# Patient Record
Sex: MALE | Race: WHITE | NOT HISPANIC OR LATINO | Employment: PART TIME
[De-identification: names, ages, dates, MRNs, and addresses within clinical notes are randomized per-mention and may not be internally consistent; named-entity substitution may affect disease eponyms.]

---

## 2023-12-31 ENCOUNTER — HEALTH MAINTENANCE LETTER (OUTPATIENT)
Age: 28
End: 2023-12-31

## 2024-02-01 ENCOUNTER — OFFICE VISIT (OUTPATIENT)
Dept: INTERNAL MEDICINE | Facility: CLINIC | Age: 29
End: 2024-02-01
Payer: COMMERCIAL

## 2024-02-01 ENCOUNTER — LAB (OUTPATIENT)
Dept: LAB | Facility: CLINIC | Age: 29
End: 2024-02-01
Payer: COMMERCIAL

## 2024-02-01 VITALS
DIASTOLIC BLOOD PRESSURE: 73 MMHG | WEIGHT: 215.8 LBS | OXYGEN SATURATION: 97 % | BODY MASS INDEX: 27.7 KG/M2 | HEIGHT: 74 IN | HEART RATE: 64 BPM | SYSTOLIC BLOOD PRESSURE: 135 MMHG

## 2024-02-01 DIAGNOSIS — Z11.59 NEED FOR HEPATITIS C SCREENING TEST: ICD-10-CM

## 2024-02-01 DIAGNOSIS — Z11.4 SCREENING FOR HIV (HUMAN IMMUNODEFICIENCY VIRUS): ICD-10-CM

## 2024-02-01 DIAGNOSIS — F90.2 ATTENTION DEFICIT HYPERACTIVITY DISORDER (ADHD), COMBINED TYPE: Primary | ICD-10-CM

## 2024-02-01 DIAGNOSIS — F32.9 MAJOR DEPRESSIVE DISORDER, REMISSION STATUS UNSPECIFIED, UNSPECIFIED WHETHER RECURRENT: ICD-10-CM

## 2024-02-01 LAB
HCV AB SERPL QL IA: NONREACTIVE
HIV 1+2 AB+HIV1 P24 AG SERPL QL IA: NONREACTIVE

## 2024-02-01 PROCEDURE — 99000 SPECIMEN HANDLING OFFICE-LAB: CPT | Performed by: PATHOLOGY

## 2024-02-01 PROCEDURE — 87389 HIV-1 AG W/HIV-1&-2 AB AG IA: CPT | Performed by: PEDIATRICS

## 2024-02-01 PROCEDURE — 36415 COLL VENOUS BLD VENIPUNCTURE: CPT | Performed by: PATHOLOGY

## 2024-02-01 PROCEDURE — 86803 HEPATITIS C AB TEST: CPT | Performed by: PEDIATRICS

## 2024-02-01 PROCEDURE — 90471 IMMUNIZATION ADMIN: CPT

## 2024-02-01 PROCEDURE — 90686 IIV4 VACC NO PRSV 0.5 ML IM: CPT

## 2024-02-01 PROCEDURE — 99204 OFFICE O/P NEW MOD 45 MIN: CPT | Mod: 25

## 2024-02-01 RX ORDER — BUPROPION HYDROCHLORIDE 150 MG/1
150 TABLET ORAL EVERY MORNING
Qty: 30 TABLET | Refills: 0 | Status: SHIPPED | OUTPATIENT
Start: 2024-02-01 | End: 2024-05-26

## 2024-02-01 RX ORDER — TRIAMCINOLONE ACETONIDE 0.25 MG/G
OINTMENT TOPICAL PRN
COMMUNITY
Start: 2022-01-03 | End: 2024-02-02

## 2024-02-01 RX ORDER — METHYLPHENIDATE HYDROCHLORIDE 27 MG/1
27 TABLET ORAL EVERY MORNING
Qty: 30 TABLET | Refills: 0 | Status: SHIPPED | OUTPATIENT
Start: 2024-02-01 | End: 2024-02-22 | Stop reason: ALTCHOICE

## 2024-02-01 RX ORDER — DEXTROAMPHETAMINE SACCHARATE, AMPHETAMINE ASPARTATE MONOHYDRATE, DEXTROAMPHETAMINE SULFATE AND AMPHETAMINE SULFATE 5; 5; 5; 5 MG/1; MG/1; MG/1; MG/1
20 CAPSULE, EXTENDED RELEASE ORAL DAILY
COMMUNITY
Start: 2019-01-07 | End: 2024-02-01 | Stop reason: ALTCHOICE

## 2024-02-01 ASSESSMENT — ANXIETY QUESTIONNAIRES
7. FEELING AFRAID AS IF SOMETHING AWFUL MIGHT HAPPEN: NOT AT ALL
1. FEELING NERVOUS, ANXIOUS, OR ON EDGE: SEVERAL DAYS
3. WORRYING TOO MUCH ABOUT DIFFERENT THINGS: SEVERAL DAYS
IF YOU CHECKED OFF ANY PROBLEMS ON THIS QUESTIONNAIRE, HOW DIFFICULT HAVE THESE PROBLEMS MADE IT FOR YOU TO DO YOUR WORK, TAKE CARE OF THINGS AT HOME, OR GET ALONG WITH OTHER PEOPLE: SOMEWHAT DIFFICULT
6. BECOMING EASILY ANNOYED OR IRRITABLE: NOT AT ALL
GAD7 TOTAL SCORE: 4
5. BEING SO RESTLESS THAT IT IS HARD TO SIT STILL: NOT AT ALL
GAD7 TOTAL SCORE: 4
2. NOT BEING ABLE TO STOP OR CONTROL WORRYING: SEVERAL DAYS

## 2024-02-01 ASSESSMENT — PATIENT HEALTH QUESTIONNAIRE - PHQ9
5. POOR APPETITE OR OVEREATING: SEVERAL DAYS
SUM OF ALL RESPONSES TO PHQ QUESTIONS 1-9: 12

## 2024-02-01 NOTE — PATIENT INSTRUCTIONS
Summary of today's plan:  - HIV & Hepatitis C screen ordered  - Flu vaccine today  - Methylphenidate (Concerta) ordered at 27 mg daily (previous dose was 36 mg daily) for 1 month supply  - Bupropion HBr ordered at 174 mg daily (previous dose was 348 mg daily) for 1 month supply  - Please upload immunization records to [facility] when convenient  - Reach out via E-Visit on [facility] in 3 weeks

## 2024-02-01 NOTE — PROGRESS NOTES
"I, Nima Walter MD saw the patient with the resident, and agree with the resident's findings and plan of care as documented in the resident's note.  /73 (BP Location: Right arm, Patient Position: Sitting, Cuff Size: Adult Large)   Pulse 64   Ht 1.88 m (6' 2\")   Wt 97.9 kg (215 lb 12.8 oz)   SpO2 97%   BMI 27.71 kg/m    I personally reviewed vital signs and past record.  Key findings: ADD, has been off meds because previous provider retired. Was switched off of Adderall for preference of local clinic. He is fine with continuing methylphenidate. Best to restart at regular dose before returning to his medium dose that he has been off for a while.  "

## 2024-02-01 NOTE — PROGRESS NOTES
Manohar is a 28 year old that presents in clinic today for the following:     Chief Complaint   Patient presents with    Establish Care           2/1/2024     8:51 AM   Additional Questions   Roomed by MR     Screenings as of today     PHQ-2 Total Score (Adult) - Positive if 3 or more points; Administer   PHQ-9 if positive 4        Vanessa Cordova EMT at 8:57 AM on 2/1/2024

## 2024-02-01 NOTE — PROGRESS NOTES
"Subjective     Manohar SURENDRA Vera is a 28 year old man, with concerns including:  Chief Complaint   Patient presents with    Establish Care     PCP: No Ref-Primary, Physician   Visit type: problem-oriented    PMH of MDD and ADHD.     Feels overall well with no acute health concerns. Has had ADHD since he was a kid but did not get started on medication until he was ~23 years old. Has been a very noticeable improvement. Off medication since about November 2023 due to changing providers and insurance.     Was on Concerta for 7923-3775 (about 10-12 months). Prior to that was on Adderall XR 20 mg daily. Also takes bupropion for MDD.     Moved to [facility] from [facility] about 6 months ago. Plays soccer 2-3 times a week. At the gym 6 days a week. Eats a varied diet with lots of protein and veggies.     Denies any current fevers/chills, SOB, chest pain, or abd pain.       Review of Systems  As noted in HPI.     As part of this encounter, I reviewed (and updated as appropriate) the past medical, family, and social history.    Past Medical History:   Diagnosis Date    Depressive disorder 1/1/23     No past surgical history on file.      Objective     /73 (BP Location: Right arm, Patient Position: Sitting, Cuff Size: Adult Large)   Pulse 64   Ht 1.88 m (6' 2\")   Wt 97.9 kg (215 lb 12.8 oz)   SpO2 97%   BMI 27.71 kg/m      Physical Exam   Gen: No acute distress. Appears comfortable.   HEENT: Normocephalic, atraumatic. PERRLA, EOMI. No cervical adenopathy.   CV: Regular rate and rhythm without murmur.   Resp: CTAB, non-labored breathing.   Abd: Soft, non-tender to palpation.   Extr: No pitting edema in bilateral lower extremities. Warm and well-perfused.   Neuro: Alert and oriented x3. Cranial nerves II-XII intact. Strength is normal and symmetrical in upper and lower extremities. Gait is normal.   Psych: Appropriate affect.         Assessment & Plan     Manohar is a 28yoM with PMH of ADHD and MDD who presents to " establish care.     # Health maintenance  - Received TDAP within last 10 years  - Flu vaccine today, patient will complete COVID vaccine at a later time  - HIV & Hepatitis C screen ordered    # ADHD  Most recently on Concerta 5159-7199 but has been off all meds for the last 3 months due to transitioning healthcare. Was also previously on Adderall XR prior to that.   - Will resume on methylphenidate at partial dose (previously on 36 mg daily) of 27 mg daily, 1 months supply ordered  - Can likely increase methylphenidate to 36 mg daily after 1 month  - E-visit in 3 weeks to review med resumption    # MDD  Previously on bupropion HBr 348 mg daily but has been off this for last 3 months due to transitioning healthcare.   - Will start buproprion (24 hr) at 150 mg daily (previously HBr 348 mg daily), 1 month supply ordered  - Can likely increase bupropion to 300 mg daily after 1 month  - E-visit in 3 weeks to review med resumption    RTC: E-visit in 3 weeks        Patient seen and discussed with Dr. Walter.     Fredo De Paz MD  PGY-2  Internal Medicine

## 2024-02-02 ENCOUNTER — MYC REFILL (OUTPATIENT)
Dept: INTERNAL MEDICINE | Facility: CLINIC | Age: 29
End: 2024-02-02
Payer: COMMERCIAL

## 2024-02-02 DIAGNOSIS — K62.89 ANAL IRRITATION: Primary | ICD-10-CM

## 2024-02-02 NOTE — TELEPHONE ENCOUNTER
triamcinolone (KENALOG) 0.025 % external ointment      Last Written Prescription Date:  1/3/22  Last Fill Quantity: na,   # refills: nq  Last Office Visit : 2/1/24  Future Office visit:  2/22/24    Routing refill request to provider for review/approval because:  Medication is reported/historical

## 2024-02-07 PROBLEM — K62.89 ANAL IRRITATION: Status: ACTIVE | Noted: 2024-02-07

## 2024-02-07 RX ORDER — TRIAMCINOLONE ACETONIDE 0.25 MG/G
OINTMENT TOPICAL PRN
Qty: 15 G | Refills: 3 | Status: SHIPPED | OUTPATIENT
Start: 2024-02-07

## 2024-02-08 NOTE — ASSESSMENT & PLAN NOTE
"[employer] message:  The Triamcinolone was prescribed to me by Dr. Morris at [facility] while I was in [place]. (Would it be helpful if I called them and asked that they transfer any pertinent information?)     He gave me this cream to care for a skin irritation, rather unfortunately, on the anus. I will occasionally experience redness, chaffing, and eventually bleeding, leading to an inability to walk or function in a normal work environment.      I have tried to determine the cause, thinking it may be linked to something in my diet causing unhealthy bowel movements, but I have yet to find anything that seems to be a specific cause.      For now I have been managing the occasional discomfort (Usually once per week) by applying the Triamcinolone. It has relieved the issue each time.      My reply  Thanks, that's helpful. I refilled the cream.  Also, I was able to get your records from [facility] by the computer system. Sorry - the rooming staff should have done that at your visit.         One caution: if you use the cream daily for a long time, then there could be further weakening of the skin, or (less likely) of the sphincter muscle itself. I recommend:  (a) mostly the irritation should be treated with a cream (example = vanicream) or petroleum jelly (ideally from a tube rather than a dish, so the jelly container stays clean).  (b) if you find blood in your stool, we should see you in person.  (c) If the problem gets worse or needs triamcinolone for more than a week at a time, we should have it examined by someone - either a dermatologist or a primary care provider who is experienced (i.e., also known as \"old,\" like me). If the problem becomes serious, we can always have you seen by a colorectal provider.    If you have any questions or concerns, please call my office (at [phone]) and ask to speak with my nurse.   "

## 2024-02-22 ENCOUNTER — VIRTUAL VISIT (OUTPATIENT)
Dept: INTERNAL MEDICINE | Facility: CLINIC | Age: 29
End: 2024-02-22
Payer: COMMERCIAL

## 2024-02-22 DIAGNOSIS — F90.2 ATTENTION DEFICIT HYPERACTIVITY DISORDER (ADHD), COMBINED TYPE: Primary | ICD-10-CM

## 2024-02-22 DIAGNOSIS — F32.9 MAJOR DEPRESSIVE DISORDER, REMISSION STATUS UNSPECIFIED, UNSPECIFIED WHETHER RECURRENT: ICD-10-CM

## 2024-02-22 PROCEDURE — 99442 PR PHYSICIAN TELEPHONE EVALUATION 11-20 MIN: CPT | Mod: GC

## 2024-02-22 RX ORDER — BUPROPION HYDROCHLORIDE 150 MG/1
150 TABLET ORAL EVERY MORNING
Qty: 30 TABLET | Refills: 0 | Status: SHIPPED | OUTPATIENT
Start: 2024-02-29 | End: 2024-04-02

## 2024-02-22 RX ORDER — METHYLPHENIDATE HYDROCHLORIDE 36 MG/1
36 TABLET ORAL EVERY MORNING
Qty: 90 TABLET | Refills: 0 | Status: SHIPPED | OUTPATIENT
Start: 2024-02-22 | End: 2024-05-22

## 2024-02-22 NOTE — PROGRESS NOTES
"Manohar is a 28 year old who is being evaluated via a billable telephone visit.      What phone number would you like to be contacted at? [phone]   How would you like to obtain your AVS? [facility]    Distant Location (provider location):  On-site    Assessment & Plan   28 y.o male with PMH of Attention deficit hyperactivity disorder and major depressive disorder  presents over telephone visit for titration of medications for same.     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   Feels improvement in ADHD symptoms though not at his baseline where he used to be on the 20 mg of Amphetamine (Adderall); Started last 3 weeks by Dr. De Paz on Methylphenydate (Concerta) at 27 mg (equivalent to 15 mg of Amphetamine; patient previously on 20 mg); So will uptitrate to 36 today of Methylphenydate, and patient will give interval feedback to Dr. Orr and follow up in 90 days for refills.   - methylphenidate HCl ER, OSM, (CONCERTA) 36 MG CR tablet; Take 1 tablet (36 mg) by mouth every morning for 90 days    Major depressive disorder, remission status unspecified, unspecified whether recurrent    Subjective improvement of mood in the interval. Adequate to complete a full fair trial of 6 weeks before considering uptitrating. Will extend dose of 150 mg for total of 6 weeks (will actually give 2 weeks more than this) Patient to [employer] message Dr. Orr around 3/15/2024 for decision regarding increasing or not based on symptoms. He endorsed understnaing.   - buPROPion (WELLBUTRIN XL) 150 MG 24 hr tablet; Take 1 tablet (150 mg) by mouth every morning for 30 days            BMI  Estimated body mass index is 27.71 kg/m  as calculated from the following:    Height as of 2/1/24: 1.88 m (6' 2\").    Weight as of 2/1/24: 97.9 kg (215 lb 12.8 oz). "         FUTURE APPOINTMENTS:       - Follow-up visit in 3 months; with PCP for ongoing refills.     No follow-ups on file.    Chery Villela is a 28 year old, presenting for the following health issues:  RECHECK and Recheck Medication        2/1/2024     8:51 AM   Additional Questions   Roomed by MR     History of Present Illness       Reason for visit:  Check up for medications    He eats 0-1 servings of fruits and vegetables daily.He consumes 1 sweetened beverage(s) daily.He exercises with enough effort to increase his heart rate 60 or more minutes per day.  He exercises with enough effort to increase his heart rate 6 days per week.   He is taking medications regularly.        Busy overall. Requires focus.   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   Feels improvement in ADHD symptoms though not at his baseline where he used to be on the 20 mg of Amphetamine (Adderall); Started last 3 weeks by Dr. De Paz on Methylphenydate (Concerta) at 27 mg (equivalent to 15 mg of Amphetamine; patient previously on 20 mg); So will uptitrate to 36 today of Methylphenydate, and patient will give interval feedback to Dr. Orr and follow up in 90 days for refills.     MDD - also interval improvement on bupropion 150 every day, open to full adequate trial of 6 weeks and will msg Dr. Orr via my Chart about interval symptoms to see if need for titrating further.     ROS    Denies any headaches dizziness, vision changes, hasn't noticed appetite increase at this time. Endorses     Objective    Vitals - Patient Reported  Pain Score: No Pain (0)  No vitals as telephone visit.       Physical Exam   Neurologic: can hear and make sensible conversation over telephone.   Pulm: Speaking in full sentences, doesn't sound dyspneic over telephone.         Phone call duration: 15  minutes  Dr. Orr was present  the entirety of the phone visit.   Signed Electronically by: DEBBI WILL MD

## 2024-02-22 NOTE — PATIENT INSTRUCTIONS
Thank you for visiting the Primary Care Center today at the [facility]! The following is some information about our clinic:     Primary Care Center Frequently-Asked Questions    (1) How do I schedule appointments at the [facility]?     Primary Care--to schedule or make changes to an existing appointment, please call our primary care line at [phone].    Labs--to schedule a lab appointment at the [facility] you can use [employer] or call [phone]. If you have a [facility] location that is closer to home, you can reach out to that location for scheduling options.     Imaging--if you need to schedule a CT, X-ray, MRI, ultrasound, or other imaging study you can call [phone] to schedule at the [facility] or any other [facility] imaging location.     Referrals--if a referral to another specialty was ordered you can expect a phone call from their scheduling team. If you have not heard from them in a week, please call us or send us a [employer] message to check the status or get a scheduling number. Please note that this only applies to internal [facility] referrals. If the referral is external you would need to contact their office for scheduling.     (2) I have a question about my visit, who do I contact?     You can call us at the primary care line at [phone] to ask questions about your visit. You can also send a secure message through [employer], which is reviewed by clinic staff. Please note that [employer] messages have a twenty-four to forty-eight business hour turnaround time and should not be used for urgent concerns.    (3) How will I get the results of my tests?    If you are signed up for [employer] all tests will be released to you within twenty-four hours of resulting. Please allow three to five days for your doctor to review your results and place a note interpreting the results. If you do not have [employer] you will receive your  results through mail seven to ten business days following the return of the tests. Please note that if there should be any urgent or concerning results that your doctor or their registered nurse will reach out to you the same day as the tests come back. If you have follow up questions about your results or would like to discuss the results in detail please schedule a follow up with your provider either in person or virtually.     (4) How do I get refills of my prescriptions?     You should always first contact your pharmacy for refills of your medications. If submitting a refill request on [employer], please be sure to submit the request only once--repeat requests can cause delays in refill. If you are requesting a NEW medication or a medication related to new symptoms you will need to schedule an appointment with a provider prior to approval. Please note: Routine medication refills have up to one to three business day turnaround whereas controlled substances refills have up to five to seven business day turnaround.    (5) I have new symptoms, what do I do?     If you are having an immediate medical emergency, you should dial 911 for assistance.   For anything urgent that needs to be seen within a few hours to one day you should visit a local urgent care for assistance.  For non-urgent symptoms that need to be seen within a few days to a week you can schedule with an available provider in primary care by going to [employer] or calling [phone].   If you are not sure how serious your symptoms are or you would like to receive medical advice you can always call [phone] to speak with a triage nurse.

## 2024-02-22 NOTE — NURSING NOTE
Is the patient currently in the state of [place]? YES    Visit mode:TELEPHONE    If the visit is dropped, the patient can be reconnected by: TELEPHONE VISIT: Phone number: [phone]    Will anyone else be joining the visit? NO  (If patient encounters technical issues they should call [phone])    How would you like to obtain your AVS? MyChart    Are changes needed to the allergy or medication list? Pt stated no changes to allergies and Pt stated no med changes    Reason for visit: RECHECK and Recheck Medication    Riddhi RYANF

## 2024-04-02 ENCOUNTER — MYC REFILL (OUTPATIENT)
Dept: INTERNAL MEDICINE | Facility: CLINIC | Age: 29
End: 2024-04-02
Payer: COMMERCIAL

## 2024-04-02 DIAGNOSIS — F32.9 MAJOR DEPRESSIVE DISORDER, REMISSION STATUS UNSPECIFIED, UNSPECIFIED WHETHER RECURRENT: ICD-10-CM

## 2024-04-03 RX ORDER — BUPROPION HYDROCHLORIDE 150 MG/1
150 TABLET ORAL EVERY MORNING
Qty: 90 TABLET | Refills: 3 | Status: SHIPPED | OUTPATIENT
Start: 2024-04-03

## 2024-05-23 ENCOUNTER — OFFICE VISIT (OUTPATIENT)
Dept: INTERNAL MEDICINE | Facility: CLINIC | Age: 29
End: 2024-05-23
Payer: COMMERCIAL

## 2024-05-23 VITALS
HEIGHT: 74 IN | OXYGEN SATURATION: 98 % | SYSTOLIC BLOOD PRESSURE: 121 MMHG | DIASTOLIC BLOOD PRESSURE: 75 MMHG | BODY MASS INDEX: 25.26 KG/M2 | WEIGHT: 196.8 LBS | HEART RATE: 77 BPM

## 2024-05-23 DIAGNOSIS — F90.2 ATTENTION DEFICIT HYPERACTIVITY DISORDER (ADHD), COMBINED TYPE: ICD-10-CM

## 2024-05-23 PROCEDURE — 99213 OFFICE O/P EST LOW 20 MIN: CPT | Mod: GC

## 2024-05-23 RX ORDER — METHYLPHENIDATE HYDROCHLORIDE 36 MG/1
36 TABLET ORAL EVERY MORNING
Qty: 90 TABLET | Refills: 0 | Status: SHIPPED | OUTPATIENT
Start: 2024-05-23 | End: 2024-06-20

## 2024-05-23 RX ORDER — METHYLPHENIDATE HYDROCHLORIDE 36 MG/1
36 TABLET ORAL EVERY MORNING
Qty: 90 TABLET | Refills: 0 | Status: CANCELLED | OUTPATIENT
Start: 2024-05-23

## 2024-05-23 ASSESSMENT — PATIENT HEALTH QUESTIONNAIRE - PHQ9
10. IF YOU CHECKED OFF ANY PROBLEMS, HOW DIFFICULT HAVE THESE PROBLEMS MADE IT FOR YOU TO DO YOUR WORK, TAKE CARE OF THINGS AT HOME, OR GET ALONG WITH OTHER PEOPLE: SOMEWHAT DIFFICULT
SUM OF ALL RESPONSES TO PHQ QUESTIONS 1-9: 9
SUM OF ALL RESPONSES TO PHQ QUESTIONS 1-9: 9

## 2024-05-23 NOTE — PATIENT INSTRUCTIONS
Summary of today's plan:  - Continue bupropion (Wellbutrin) at current dose of 150 mg daily  - Continue to follow with your therapist  - Refilled Concerta 36 mg daily  - OK to adjust timing of Concerta on certain long days at work  - Follow up via Virtual Visit in ~3 months

## 2024-05-23 NOTE — PROGRESS NOTES
Assessment & Plan     Manohar is a 29yoM with PMH of MDD and ADHD who presents for follow-up.     # ADHD  Doing well on Concerta 36 mg daily. Feels that this is the right dose for him. Does struggle with some longer days at work.   - Continue Concerta 36 mg daily  - Discussed strategies to adjust timing of when to take Concerta to accommodate for extended days at work    # MDD  Currently feeling well with bupropion 150 mg daily. Has occasional thoughts of self harm, but he states he is never serious about it and is seeing a therapist currently which has been very helpful. No suicidal ideation. He feels that overall, his mental health is in a good place at this time. No change to medication regimen.   - Continue bupropion 150 mg daily  - Continue to follow with therapist      Depression Screening Follow Up  Addressed above        Follow Up Actions Taken  - Patient has upcoming appointment with his therapist  - Acute mental health concerns addressed at this visit as above    Discussed the following ways the patient can remain in a safe environment:    Patient currently feels safe at his home environment    RTC: 3 months via virtual visit    Patient seen and discussed with Dr. Orr.     Fredo De Paz MD  PGY2, Internal Medicine      Subjective   Manohar is a 29 year old, presenting for the following health issues:  Follow Up (Pt here for follow up; recheck medication)      5/23/2024     7:59 AM   Additional Questions   Roomed by Mary BOND     History of Present Illness       Reason for visit:  Checkup due to medication    He eats 2-3 servings of fruits and vegetables daily.He consumes 1 sweetened beverage(s) daily.He exercises with enough effort to increase his heart rate 60 or more minutes per day.  He exercises with enough effort to increase his heart rate 6 days per week.   He is taking medications regularly.    Has PMH of MDD and ADHD.     Here for follow-up. Was restarted on Concerta for ADHD about 3 months ago  "after a period off meds due to transition of care. Feeling well from ADHD standpoint and feels that his dose is good without significant side effects. He notes that he does have some longer days at work where he feels like the effect of the medication does not last him the whole day, in particular on days where he has 18 hours of work in a row (full day at [employer] followed by [employer] shift).     With regard to his mood, he does have occasional days where he has thoughts of self harm. He states that he never takes these thoughts serious and does not have suicidal ideation at this time. He just feels overall that life has been busy and stressful due to working 2 part-time jobs with tough schedules. He is currently seeing a therapist and has a follow-up scheduled for later today. He is happy with his current medication and therapist. Feels safe at home.     Denies any fevers/chills or other health concerns. 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 Exercises 5-6 times a week and participates in 3 different soccer leagues.      ROS as noted in HPI.         Objective    /75 (BP Location: Right arm, Patient Position: Sitting, Cuff Size: Adult Large)   Pulse 77   Ht 1.88 m (6' 2\")   Wt 89.3 kg (196 lb 12.8 oz)   SpO2 98%   BMI 25.27 kg/m    Body mass index is 25.27 kg/m .  Physical Exam   Gen: No acute distress, sitting up in chair.   HEENT: Normocephalic, atraumatic.   CV: Regular rate and rhythm.   Resp: CTAB, non-labored breathing on room air.   Abd: Soft, non-tender to palpation, +BS.   Neuro: Alert and conversant.   Psych: Appropriate affect without suicidal ideation. Mood is \"ok.\"            Signed Electronically by: Fredo De Paz MD  "

## 2024-06-20 ENCOUNTER — MYC REFILL (OUTPATIENT)
Dept: INTERNAL MEDICINE | Facility: CLINIC | Age: 29
End: 2024-06-20
Payer: COMMERCIAL

## 2024-06-20 DIAGNOSIS — F90.2 ATTENTION DEFICIT HYPERACTIVITY DISORDER (ADHD), COMBINED TYPE: ICD-10-CM

## 2024-06-20 RX ORDER — METHYLPHENIDATE HYDROCHLORIDE 36 MG/1
36 TABLET ORAL EVERY MORNING
Qty: 90 TABLET | Refills: 0 | Status: SHIPPED | OUTPATIENT
Start: 2024-06-20 | End: 2024-10-04

## 2024-07-10 ENCOUNTER — MYC REFILL (OUTPATIENT)
Dept: INTERNAL MEDICINE | Facility: CLINIC | Age: 29
End: 2024-07-10
Payer: COMMERCIAL

## 2024-07-10 DIAGNOSIS — F32.9 MAJOR DEPRESSIVE DISORDER, REMISSION STATUS UNSPECIFIED, UNSPECIFIED WHETHER RECURRENT: ICD-10-CM

## 2024-07-10 RX ORDER — BUPROPION HYDROCHLORIDE 150 MG/1
150 TABLET ORAL EVERY MORNING
Qty: 90 TABLET | Refills: 3 | OUTPATIENT
Start: 2024-07-10

## 2024-07-10 NOTE — TELEPHONE ENCOUNTER
Requesting refill too early. Years worth of refills were sent to pharmacy in April 2024.  Tami MERRILL LPN  [facility]

## 2024-08-08 ENCOUNTER — OFFICE VISIT (OUTPATIENT)
Dept: INTERNAL MEDICINE | Facility: CLINIC | Age: 29
End: 2024-08-08
Payer: COMMERCIAL

## 2024-08-08 ENCOUNTER — LAB (OUTPATIENT)
Dept: LAB | Facility: CLINIC | Age: 29
End: 2024-08-08
Payer: COMMERCIAL

## 2024-08-08 VITALS
DIASTOLIC BLOOD PRESSURE: 73 MMHG | HEART RATE: 72 BPM | BODY MASS INDEX: 25.1 KG/M2 | HEIGHT: 74 IN | WEIGHT: 195.6 LBS | OXYGEN SATURATION: 98 % | SYSTOLIC BLOOD PRESSURE: 111 MMHG

## 2024-08-08 DIAGNOSIS — L63.9 ALOPECIA AREATA: Primary | ICD-10-CM

## 2024-08-08 DIAGNOSIS — L63.9 ALOPECIA AREATA: ICD-10-CM

## 2024-08-08 LAB — TSH SERPL DL<=0.005 MIU/L-ACNC: 2.02 UIU/ML (ref 0.3–4.2)

## 2024-08-08 PROCEDURE — 99000 SPECIMEN HANDLING OFFICE-LAB: CPT | Performed by: PATHOLOGY

## 2024-08-08 PROCEDURE — 86376 MICROSOMAL ANTIBODY EACH: CPT | Performed by: INTERNAL MEDICINE

## 2024-08-08 PROCEDURE — 36415 COLL VENOUS BLD VENIPUNCTURE: CPT | Performed by: PATHOLOGY

## 2024-08-08 PROCEDURE — 99213 OFFICE O/P EST LOW 20 MIN: CPT | Mod: GE

## 2024-08-08 PROCEDURE — 84443 ASSAY THYROID STIM HORMONE: CPT | Performed by: PATHOLOGY

## 2024-08-08 RX ORDER — TRAZODONE HYDROCHLORIDE 50 MG/1
TABLET, FILM COATED ORAL
COMMUNITY
Start: 2023-01-07

## 2024-08-08 ASSESSMENT — PATIENT HEALTH QUESTIONNAIRE - PHQ9
SUM OF ALL RESPONSES TO PHQ QUESTIONS 1-9: 6
SUM OF ALL RESPONSES TO PHQ QUESTIONS 1-9: 6
10. IF YOU CHECKED OFF ANY PROBLEMS, HOW DIFFICULT HAVE THESE PROBLEMS MADE IT FOR YOU TO DO YOUR WORK, TAKE CARE OF THINGS AT HOME, OR GET ALONG WITH OTHER PEOPLE: SOMEWHAT DIFFICULT

## 2024-08-08 NOTE — PATIENT INSTRUCTIONS
Summary of today's plan:  - Screening for thyroid disorders  - Dermatology referral for intralesional corticosteroid injection

## 2024-08-08 NOTE — PROGRESS NOTES
"  Assessment & Plan     Manohar is a 29yoM with PMH of ADHD who presents for evaluation of hair loss.     # Alopecia areata  Focal area of complete hair loss with acute onset is concerning for alopecia areata. - Referral to dermatology for consideration of intralesional corticosteroid injection per discussion with patient  - Screening for thyroid disease with TSH and TPO antibodies    BMI  Estimated body mass index is 25.11 kg/m  as calculated from the following:    Height as of this encounter: 1.88 m (6' 2\").    Weight as of this encounter: 88.7 kg (195 lb 9.6 oz).   Not addressed at this visit    RTC: as needed    Discussed with Dr. Laura.     Fredo De Paz MD  PGY3, Internal Medicine      Subjective   Manohar is a 29 year old, presenting for the following health issues:  Consult (Hair loss/ bald spot, happened day or 2 after haircut, been 2 months hasn't grown back )      8/8/2024     2:21 PM   Additional Questions   Roomed by [facility] EMT     History of Present Illness       Reason for visit:  Worrying bald patch  Symptom onset:  More than a month  Symptoms include:  Bald spot on left sideburn.  Symptom intensity:  Moderate  Symptom progression:  Staying the same  Had these symptoms before:  Radha consumes 2 sweetened beverage(s) daily.He exercises with enough effort to increase his heart rate 60 or more minutes per day.  He exercises with enough effort to increase his heart rate 6 days per week. He is missing 1 dose(s) of medications per week.  He is not taking prescribed medications regularly due to remembering to take.    Here to discuss area of hair loss. First noticed after a haircut 2 months. Did not see at the time of haircut but came to his attention about 2 days later. At first thought it was just a bad haircut but then the area never grew back any hair. No frequent headwear or tight headwear. No pulling at hair. No fevers/chills, skin injuries, or rash.     ROS as noted in HPI.             Objective    /73 " "(BP Location: Right arm, Patient Position: Sitting, Cuff Size: Adult Regular)   Pulse 72   Ht 1.88 m (6' 2\")   Wt 88.7 kg (195 lb 9.6 oz)   SpO2 98%   BMI 25.11 kg/m    Body mass index is 25.11 kg/m .  Physical Exam   Gen: No acute distress  HEENT: Circular ~5 cm area of complete hair loss at left upper-lateral temple area  CV: Regular rate  Pulm: Non-labored breathing  Neuro: Alert and conversant            Signed Electronically by: Fredo De Paz MD    "

## 2024-08-09 LAB — THYROPEROXIDASE AB SERPL-ACNC: 27 IU/ML

## 2024-10-04 ENCOUNTER — MYC REFILL (OUTPATIENT)
Dept: INTERNAL MEDICINE | Facility: CLINIC | Age: 29
End: 2024-10-04
Payer: COMMERCIAL

## 2024-10-04 DIAGNOSIS — F90.2 ATTENTION DEFICIT HYPERACTIVITY DISORDER (ADHD), COMBINED TYPE: ICD-10-CM

## 2024-10-06 RX ORDER — METHYLPHENIDATE HYDROCHLORIDE 36 MG/1
36 TABLET ORAL EVERY MORNING
Qty: 90 TABLET | Refills: 0 | Status: SHIPPED | OUTPATIENT
Start: 2024-10-06

## 2025-01-19 ENCOUNTER — HEALTH MAINTENANCE LETTER (OUTPATIENT)
Age: 30
End: 2025-01-19

## 2025-02-16 SDOH — HEALTH STABILITY: PHYSICAL HEALTH: ON AVERAGE, HOW MANY MINUTES DO YOU ENGAGE IN EXERCISE AT THIS LEVEL?: 60 MIN

## 2025-02-16 SDOH — HEALTH STABILITY: PHYSICAL HEALTH: ON AVERAGE, HOW MANY DAYS PER WEEK DO YOU ENGAGE IN MODERATE TO STRENUOUS EXERCISE (LIKE A BRISK WALK)?: 6 DAYS

## 2025-02-16 ASSESSMENT — SOCIAL DETERMINANTS OF HEALTH (SDOH): HOW OFTEN DO YOU GET TOGETHER WITH FRIENDS OR RELATIVES?: ONCE A WEEK

## 2025-02-19 ENCOUNTER — OFFICE VISIT (OUTPATIENT)
Dept: FAMILY MEDICINE | Facility: CLINIC | Age: 30
End: 2025-02-19
Payer: COMMERCIAL

## 2025-02-19 VITALS
SYSTOLIC BLOOD PRESSURE: 102 MMHG | RESPIRATION RATE: 14 BRPM | HEIGHT: 74 IN | BODY MASS INDEX: 25.67 KG/M2 | TEMPERATURE: 97.2 F | DIASTOLIC BLOOD PRESSURE: 76 MMHG | OXYGEN SATURATION: 97 % | WEIGHT: 200 LBS | HEART RATE: 58 BPM

## 2025-02-19 DIAGNOSIS — F33.1 MODERATE RECURRENT MAJOR DEPRESSION (H): ICD-10-CM

## 2025-02-19 DIAGNOSIS — Z00.00 ENCOUNTER FOR ANNUAL PHYSICAL EXAM: Primary | ICD-10-CM

## 2025-02-19 DIAGNOSIS — F90.2 ATTENTION DEFICIT HYPERACTIVITY DISORDER (ADHD), COMBINED TYPE: ICD-10-CM

## 2025-02-19 DIAGNOSIS — Z11.3 SCREEN FOR STD (SEXUALLY TRANSMITTED DISEASE): ICD-10-CM

## 2025-02-19 LAB
ALBUMIN SERPL BCG-MCNC: 4.2 G/DL (ref 3.5–5.2)
ALP SERPL-CCNC: 54 U/L (ref 40–150)
ALT SERPL W P-5'-P-CCNC: 27 U/L (ref 0–70)
ANION GAP SERPL CALCULATED.3IONS-SCNC: 11 MMOL/L (ref 7–15)
AST SERPL W P-5'-P-CCNC: 20 U/L (ref 0–45)
BILIRUB SERPL-MCNC: 0.3 MG/DL
BUN SERPL-MCNC: 19.8 MG/DL (ref 6–20)
C TRACH DNA SPEC QL NAA+PROBE: NEGATIVE
CALCIUM SERPL-MCNC: 9.3 MG/DL (ref 8.8–10.4)
CHLORIDE SERPL-SCNC: 104 MMOL/L (ref 98–107)
CHOLEST SERPL-MCNC: 128 MG/DL
CREAT SERPL-MCNC: 0.98 MG/DL (ref 0.67–1.17)
EGFRCR SERPLBLD CKD-EPI 2021: >90 ML/MIN/1.73M2
FASTING STATUS PATIENT QL REPORTED: NO
FASTING STATUS PATIENT QL REPORTED: NO
GLUCOSE SERPL-MCNC: 81 MG/DL (ref 70–99)
HCO3 SERPL-SCNC: 25 MMOL/L (ref 22–29)
HCV AB SERPL QL IA: NONREACTIVE
HDLC SERPL-MCNC: 58 MG/DL
HIV 1+2 AB+HIV1 P24 AG SERPL QL IA: NONREACTIVE
LDLC SERPL CALC-MCNC: 54 MG/DL
N GONORRHOEA DNA SPEC QL NAA+PROBE: NEGATIVE
NONHDLC SERPL-MCNC: 70 MG/DL
POTASSIUM SERPL-SCNC: 3.9 MMOL/L (ref 3.4–5.3)
PROT SERPL-MCNC: 6.8 G/DL (ref 6.4–8.3)
SODIUM SERPL-SCNC: 140 MMOL/L (ref 135–145)
SPECIMEN TYPE: NORMAL
SPECIMEN TYPE: NORMAL
T PALLIDUM AB SER QL: NONREACTIVE
TRIGL SERPL-MCNC: 80 MG/DL

## 2025-02-19 PROCEDURE — 99385 PREV VISIT NEW AGE 18-39: CPT | Performed by: FAMILY MEDICINE

## 2025-02-19 RX ORDER — METHYLPHENIDATE HYDROCHLORIDE 36 MG/1
36 TABLET ORAL EVERY MORNING
Qty: 90 TABLET | Refills: 0 | Status: SHIPPED | OUTPATIENT
Start: 2025-02-19

## 2025-02-19 ASSESSMENT — PATIENT HEALTH QUESTIONNAIRE - PHQ9
SUM OF ALL RESPONSES TO PHQ QUESTIONS 1-9: 10
SUM OF ALL RESPONSES TO PHQ QUESTIONS 1-9: 10
10. IF YOU CHECKED OFF ANY PROBLEMS, HOW DIFFICULT HAVE THESE PROBLEMS MADE IT FOR YOU TO DO YOUR WORK, TAKE CARE OF THINGS AT HOME, OR GET ALONG WITH OTHER PEOPLE: SOMEWHAT DIFFICULT

## 2025-02-19 NOTE — LETTER
My Depression Action Plan  Name: Manohar Vera   Date of Birth 1995  Date: 2/19/2025    My doctor: Fredo De Paz   My clinic: [address] [address] [phone]            GREEN    ZONE   Good Control    What it looks like:   Things are going generally well. You have normal ups and downs. You may even feel depressed from time to time, but bad moods usually last less than a day.   What you need to do:  Continue to care for yourself (see self care plan)  Check your depression survival kit and update it as needed  Follow your physician s recommendations including any medication.  Do not stop taking medication unless you consult with your physician first.             YELLOW         ZONE Getting Worse    What it looks like:   Depression is starting to interfere with your life.   It may be hard to get out of bed; you may be starting to isolate yourself from others.  Symptoms of depression are starting to last most all day and this has happened for several days.   You may have suicidal thoughts but they are not constant.   What you need to do:     Call your care team. Your response to treatment will improve if you keep your care team informed of your progress. Yellow periods are signs an adjustment may need to be made.     Continue your self-care.  Just get dressed and ready for the day.  Don't give yourself time to talk yourself out of it.    Talk to someone in your support network.    Open up your Depression Self-Care Plan/Wellness Kit.             RED    ZONE Medical Alert - Get Help    What it looks like:   Depression is seriously interfering with your life.   You may experience these or other symptoms: You can t get out of bed most days, can t work or engage in other necessary activities, you have trouble taking care of basic hygiene, or basic responsibilities, thoughts of suicide or death that will not go away, self-injurious  behavior.     What you need to do:  Call your care team and request a same-day appointment. If they are not available (weekends or after hours) call your local crisis line, emergency room or 911.          Depression Self-Care Plan / Wellness Kit    Many people find that medication and therapy are helpful treatments for managing depression. In addition, making small changes to your everyday life can help to boost your mood and improve your wellbeing. Below are some tips for you to consider. Be sure to talk with your medical provider and/or behavioral health consultant if your symptoms are worsening or not improving.     Sleep   Sleep hygiene  means all of the habits that support good, restful sleep. It includes maintaining a consistent bedtime and wake time, using your bedroom only for sleeping or sex, and keeping the bedroom dark and free of distractions like a computer, smartphone, or television.     Develop a Healthy Routine  Maintain good hygiene. Get out of bed in the morning, make your bed, brush your teeth, take a shower, and get dressed. Don t spend too much time viewing media that makes you feel stressed. Find time to relax each day.    Exercise  Get some form of exercise every day. This will help reduce pain and release endorphins, the  feel good  chemicals in your brain. It can be as simple as just going for a walk or doing some gardening, anything that will get you moving.      Diet  Strive to eat healthy foods, including fruits and vegetables. Drink plenty of water. Avoid excessive sugar, caffeine, alcohol, and other mood-altering substances.     Stay Connected with Others  Stay in touch with friends and family members.    Manage Your Mood  Try deep breathing, massage therapy, biofeedback, or meditation. Take part in fun activities when you can. Try to find something to smile about each day.     Psychotherapy  Be open to working with a therapist if your provider recommends it.     Medication  Be sure to  take your medication as prescribed. Most anti-depressants need to be taken every day. It usually takes several weeks for medications to work. Not all medicines work for all people. It is important to follow-up with your provider to make sure you have a treatment plan that is working for you. Do not stop your medication abruptly without first discussing it with your provider.    Crisis Resources   These hotlines are for both adults and children. They and are open 24 hours a day, 7 days a week unless noted otherwise.    National Suicide Prevention Lifeline   988 or 1-154-358-FPUW (6119)    Crisis Text Line    www.crisistextline.org  Text HOME to 332712 from anywhere in the [place], anytime, about any type of crisis. A live, trained crisis counselor will receive the text and respond quickly.    [facility] Lifeline for LGBTQ Youth  A national crisis intervention and suicide lifeline for LGBTQ youth under 25. Provides a safe place to talk without judgement. Call 1-515.121.1724; text START to 811821 or visit www.thetrevorproject.org to talk to a trained counselor.    For [facility] crisis numbers, visit the [facility] website at:  https://[place].gov/dhs/people-we-serve/adults/health-care/mental-health/resources/crisis-contacts.jsp

## 2025-02-19 NOTE — PROGRESS NOTES
"Preventive Care Visit  [facility]  Soha Hilario MD, Family Medicine  Feb 19, 2025      Assessment & Plan     Encounter for annual physical exam   Discussed diet, exercise, wellness and other preventive recommendations related to health maintenance.   Follow up as needed for acute issues.    Physical exam recommended in one year.     Declined vaccines today    - Comprehensive metabolic panel (BMP + Alb, Alk Phos, ALT, AST, Total. Bili, TP); Future  - Lipid panel reflex to direct LDL Fasting; Future  - Comprehensive metabolic panel (BMP + Alb, Alk Phos, ALT, AST, Total. Bili, TP)  - Lipid panel reflex to direct LDL Fasting    Attention deficit hyperactivity disorder (ADHD), combined type  Refilled medicines.  - methylphenidate HCl ER, OSM, (CONCERTA) 36 MG CR tablet; Take 1 tablet (36 mg) by mouth every morning.    Screen for STD (sexually transmitted disease)  Screening.  - HIV Antigen Antibody Combo; Future  - Hepatitis C Screen Reflex to HCV RNA Quant and Genotype; Future  - Treponema Abs w Reflex to RPR and Titer; Future  - Neisseria gonorrhoeae PCR; Future  - Chlamydia trachomatis PCR; Future  - HIV Antigen Antibody Combo  - Hepatitis C Screen Reflex to HCV RNA Quant and Genotype  - Treponema Abs w Reflex to RPR and Titer  - Neisseria gonorrhoeae PCR  - Chlamydia trachomatis PCR    Moderate recurrent major depression (H)  In full remission, no need for medicines.    Patient has been advised of split billing requirements and indicates understanding: Yes     [place] Prescription Monitoring Program, ( ) referenced. No indication of misuse, or abuse.      BMI  Estimated body mass index is 25.68 kg/m  as calculated from the following:    Height as of this encounter: 1.88 m (6' 2\").    Weight as of this encounter: 90.7 kg (200 lb).   Weight management plan: Discussed healthy diet and exercise guidelines    Depression Screening Follow Up        2/19/2025     7:29 AM   PHQ   PHQ-9 " Total Score 10    Q9: Thoughts of better off dead/self-harm past 2 weeks Not at all       Patient-reported         2/19/2025     7:29 AM   Last PHQ-9   1.  Little interest or pleasure in doing things 1   2.  Feeling down, depressed, or hopeless 1   3.  Trouble falling or staying asleep, or sleeping too much 2   4.  Feeling tired or having little energy 2   5.  Poor appetite or overeating 1   6.  Feeling bad about yourself 2   7.  Trouble concentrating 1   8.  Moving slowly or restless 0   Q9: Thoughts of better off dead/self-harm past 2 weeks 0   PHQ-9 Total Score 10        Patient-reported         Follow Up Actions Taken  Crisis resource information provided in After Visit Summary  Patient counseled, no additional follow up at this time.     Counseling  Appropriate preventive services were addressed with this patient via screening, questionnaire, or discussion as appropriate for fall prevention, nutrition, physical activity, Tobacco-use cessation, social engagement, weight loss and cognition.  Checklist reviewing preventive services available has been given to the patient.  Reviewed patient's diet, addressing concerns and/or questions.   He is at risk for psychosocial distress and has been provided with information to reduce risk.   The patient's PHQ-9 score is consistent with moderate depression. He was provided with information regarding depression.       Work on weight loss  Regular exercise    Subjective   Manohar is a 29 year old, presenting for the following:  Physical    Comes for an annual exam, history of ADHD,  He has been doing well with his current medication.  He denies depression, denies suicidal thought or ideation, no alcohol abuse.        2/19/2025     7:35 AM   Additional Questions   Roomed by rosa nayak ma   Accompanied by self         2/19/2025     7:35 AM   Patient Reported Additional Medications   Patient reports taking the following new medications none          HPI      Routine SIT  screenings      Health Care Directive  Patient does not have a Health Care Directive: Discussed advance care planning with patient; however, patient declined at this time.      2/16/2025   General Health   How would you rate your overall physical health? Excellent   Feel stress (tense, anxious, or unable to sleep) Rather much   (!) STRESS CONCERN      2/16/2025   Nutrition   Three or more servings of calcium each day? Yes   Diet: Regular (no restrictions)   How many servings of fruit and vegetables per day? (!) 2-3   How many sweetened beverages each day? 0-1         2/16/2025   Exercise   Days per week of moderate/strenous exercise 6 days   Average minutes spent exercising at this level 60 min         2/16/2025   Social Factors   Frequency of gathering with friends or relatives Once a week   Worry food won't last until get money to buy more No   Food not last or not have enough money for food? No   Do you have housing? (Housing is defined as stable permanent housing and does not include staying ouside in a car, in a tent, in an abandoned building, in an overnight shelter, or couch-surfing.) Yes   Are you worried about losing your housing? No   Lack of transportation? No   Unable to get utilities (heat,electricity)? No         2/16/2025   Dental   Dentist two times every year? Yes          Today's PHQ-9 Score:       2/19/2025     7:29 AM   PHQ-9 SCORE   PHQ-9 Total Score MyChart 10 (Moderate depression)   PHQ-9 Total Score 10        Patient-reported         2/16/2025   Substance Use   Alcohol more than 3/day or more than 7/wk No   Do you use any other substances recreationally? (!) CANNABIS PRODUCTS     Social History     Tobacco Use    Smoking status: Never    Smokeless tobacco: Never   Vaping Use    Vaping status: Never Used   Substance Use Topics    Alcohol use: Yes     Comment: Once every other week or so    Drug use: Yes     Types: Marijuana     Comment: Occasional use. Edibles only, no smoke. 1-2 times per  "week.           2/16/2025   STI Screening   New sexual partner(s) since last STI/HIV test? (!) YES          2/16/2025   Contraception/Family Planning   Questions about contraception or family planning No        Reviewed and updated as needed this visit by Provider                    Past Medical History:   Diagnosis Date    ADHD (attention deficit hyperactivity disorder), combined type     Depressive disorder 1/1/23     History reviewed. No pertinent surgical history.  OB History   No obstetric history on file.         Review of Systems  Constitutional, HEENT, cardiovascular, pulmonary, GI, , musculoskeletal, neuro, skin, endocrine and psych systems are negative, except as otherwise noted.     Objective    Exam  /76 (BP Location: Right arm, Patient Position: Sitting, Cuff Size: Adult Large)   Pulse 58   Temp 97.2  F (36.2  C) (Tympanic)   Resp 14   Ht 1.88 m (6' 2\")   Wt 90.7 kg (200 lb)   SpO2 97%   BMI 25.68 kg/m     Estimated body mass index is 25.68 kg/m  as calculated from the following:    Height as of this encounter: 1.88 m (6' 2\").    Weight as of this encounter: 90.7 kg (200 lb).    Physical Exam  GENERAL: alert and no distress  EYES: Eyes grossly normal to inspection, PERRL and conjunctivae and sclerae normal  HENT: ear canals and TM's normal, nose and mouth without ulcers or lesions  NECK: no adenopathy, no asymmetry, masses, or scars  RESP: lungs clear to auscultation - no rales, rhonchi or wheezes  CV: regular rate and rhythm, normal S1 S2, no S3 or S4, no murmur, click or rub, no peripheral edema  ABDOMEN: soft, nontender, no hepatosplenomegaly, no masses and bowel sounds normal  MS: no gross musculoskeletal defects noted, no edema  SKIN: no suspicious lesions or rashes  NEURO: Normal strength and tone, mentation intact and speech normal  PSYCH: mentation appears normal, affect normal/bright      Orders Placed This Encounter   Procedures    REVIEW OF HEALTH MAINTENANCE PROTOCOL ORDERS    " DEPRESSION ACTION PLAN (DAP)    HIV Antigen Antibody Combo    Hepatitis C Screen Reflex to HCV RNA Quant and Genotype    Comprehensive metabolic panel (BMP + Alb, Alk Phos, ALT, AST, Total. Bili, TP)    Lipid panel reflex to direct LDL Fasting    Treponema Abs w Reflex to RPR and Titer    Neisseria gonorrhoeae PCR    Chlamydia trachomatis PCR          Signed Electronically by: Soha Hilario MD    Answers submitted by the patient for this visit:  Patient Health Questionnaire (Submitted on 2/19/2025)  If you checked off any problems, how difficult have these problems made it for you to do your work, take care of things at home, or get along with other people?: Somewhat difficult  PHQ9 TOTAL SCORE: 10

## 2025-05-26 ENCOUNTER — MYC REFILL (OUTPATIENT)
Dept: FAMILY MEDICINE | Facility: CLINIC | Age: 30
End: 2025-05-26
Payer: COMMERCIAL

## 2025-05-26 DIAGNOSIS — F90.2 ATTENTION DEFICIT HYPERACTIVITY DISORDER (ADHD), COMBINED TYPE: ICD-10-CM

## 2025-05-27 RX ORDER — METHYLPHENIDATE HYDROCHLORIDE 36 MG/1
36 TABLET ORAL EVERY MORNING
Qty: 90 TABLET | Refills: 0 | Status: SHIPPED | OUTPATIENT
Start: 2025-05-27